# Patient Record
(demographics unavailable — no encounter records)

---

## 2024-12-20 NOTE — HISTORY OF PRESENT ILLNESS
[de-identified] : 84 year old female s/p right total knee replacement in 2005 and left total knee replacement in 2006. Patient is here for her annual follow up. She denies any pain in her total knee replacements. She reports being able to do all of her activities of daily living. She ambulates unlimited distances and denies any issues with negotiating stairs. She did have a bout of hip pain a while ago, which at this time has subsided. She reports being told in the past that she has arthritis in her hips, but since she is asymptomatic she has not undergone any treatments. Today, patient reports doing very well and denies any pain in her hips or knees.

## 2024-12-20 NOTE — ASSESSMENT
[FreeTextEntry1] : 84 year old female s/p Right TKA in 2005 and L TKA in 2006, doing very well. No evidence of loosening or wear. She has no knee pain or hip pain. While she does have arthritis of both hips, she is without symptoms. She was advised to stay active. She is very satisfied with her knee replacements and well compensated in regards to arthritic hips. Patient can follow up in 1 years time, sooner if her hips give her any issues.

## 2024-12-20 NOTE — PHYSICAL EXAM
[de-identified] : On exam, bilateral knees have full extension, 120 degrees of flexion. Non tender to palpation. Well healed midline incision bilaterally.    On exam, bilateral hips with a mildly decreased range of motion upon internal rotation. Negative impingement maneuver. Non tender to palpation.  [de-identified] : Radiographs done today AP lateral and skyline of both knees shows well aligned cemented PS TKA, well aligned no evidence of loosening   Radiographs done today AP pelvis and lateral of hips, left hip shows significantly narrowed joint space almost bone on bone. The right also shows narrowed joint spaces, sclerosis and osteophyte formation.